# Patient Record
Sex: FEMALE | Race: WHITE
[De-identification: names, ages, dates, MRNs, and addresses within clinical notes are randomized per-mention and may not be internally consistent; named-entity substitution may affect disease eponyms.]

---

## 2017-07-12 ENCOUNTER — HOSPITAL ENCOUNTER (OUTPATIENT)
Dept: HOSPITAL 63 - MAMMO | Age: 57
Discharge: HOME | End: 2017-07-12
Payer: COMMERCIAL

## 2017-07-12 DIAGNOSIS — Z12.31: Primary | ICD-10-CM

## 2017-07-12 NOTE — RAD
DATE: 7/12/2017



EXAM: DIGITAL SCREEN BILAT W/CAD



HISTORY: Asymptomatic screening mammogram



COMPARISON: Mammogram 6/9/2016, 5/11/2015, 3/5/2014.



This study was interpreted with the benefit of Computerized Aided Detection

(CAD).





The breast parenchyma shows scattered fibroglandular densities. Breast

parenchyma level B.





FINDINGS: 



Bilateral CC and MLO views were performed.  



There are no suspicious microcalcifications, masses or areas of architectural

distortion.



Findings are stable from the prior mammogram.







IMPRESSION: Negative bilateral mammogram







BI-RADS CATEGORY: 1 NEGATIVE



RECOMMENDED FOLLOW-UP: 12M 12 MONTH FOLLOW-UP



PQRS compliance statement: Patient information was entered into a reminder

system with a target due date 7/12/2018 for the next mammogram.



Mammography is a sensitive method for finding small breast cancers, but it

does not detect them all and is not a substitute for careful clinical

examination.  A negative mammogram does not negate a clinically suspicious

finding and should not result in delay in biopsying a clinically suspicious

abnormality.



"Our facility is accredited by the [employer] Mammography

Program."

## 2017-10-06 ENCOUNTER — HOSPITAL ENCOUNTER (OUTPATIENT)
Dept: HOSPITAL 63 - US | Age: 57
Discharge: HOME | End: 2017-10-06
Payer: COMMERCIAL

## 2017-10-06 DIAGNOSIS — N88.8: Primary | ICD-10-CM

## 2017-10-06 PROCEDURE — 76856 US EXAM PELVIC COMPLETE: CPT

## 2017-10-06 PROCEDURE — 76830 TRANSVAGINAL US NON-OB: CPT

## 2017-10-06 NOTE — RAD
Pelvic ultrasound, 10/6/2017:



History: Postmenopausal bleeding



Transabdominal and transvaginal scans were obtained. The uterus measures 8 x 7

x 4.5 cm. A small Nabothian cyst is noted in the cervical region. The central

uterine echo complex measures 6-7 mm in AP dimension. There is a mildly

heterogeneous, predominantly hyperechoic smooth mass within the left side of

the uterus. It measures 5.6 x 5.3 x 4.6 cm. This most likely represents a

uterine fibroid.



The ovaries are of normal size. No adnexal mass is seen. No free fluid is

evident in the pelvis.





IMPRESSION: Moderate sized uterine mass most compatible with a fibroid.

## 2018-07-18 ENCOUNTER — HOSPITAL ENCOUNTER (OUTPATIENT)
Dept: HOSPITAL 63 - MAMMO | Age: 58
Discharge: HOME | End: 2018-07-18
Payer: COMMERCIAL

## 2018-07-18 DIAGNOSIS — Z12.31: Primary | ICD-10-CM

## 2018-07-18 PROCEDURE — 77063 BREAST TOMOSYNTHESIS BI: CPT

## 2018-07-18 PROCEDURE — 77067 SCR MAMMO BI INCL CAD: CPT

## 2018-07-18 NOTE — RAD
DATE: July 18, 2018



EXAM: MAMMO LIANG SCREENING BILATERAL



HISTORY: Routine screening.



COMPARISON: Multiple priors dating back to March 5, 2014.



TECHNIQUE: 2D digital CC and MLO views were obtained.  3D tomosynthesis

imaging was performed in the CC and MLO projections.



This study was interpreted with the benefit of Computerized Aided Detection

(CAD).



FINDINGS:



The breast parenchyma is heterogeneously dense, category C, which may obscure

small masses.  There is no worrisome mass or area of architectural distortion.

 Left retroareolar mass is stable.  Lymph node deep within the left breast

rectally back from the nipple line on MLO view is stable back to 2014.   There

are no suspicious groupings of microcalcifications.  There are a few

benign-appearing calcifications.





IMPRESSION: Stable mammogram with benign findings.





BI-RADS CATEGORY: 2 BENIGN FINDING



RECOMMENDED FOLLOW-UP: 12M 12 MONTH FOLLOW-UP



PQRS compliance statement: Patient information was entered into a reminder

system with a target due date for the next mammogram.



Mammography is a sensitive method for finding small breast cancers, but it

does not detect them all and is not a substitute for careful clinical

examination.  A negative mammogram does not negate a clinically suspicious

finding and should not result in delay in biopsying a clinically suspicious

abnormality.



"Our facility is accredited by the American [employer] Mammography

Program."

## 2020-07-13 ENCOUNTER — HOSPITAL ENCOUNTER (EMERGENCY)
Dept: HOSPITAL 63 - ER | Age: 60
Discharge: HOME | End: 2020-07-13
Payer: COMMERCIAL

## 2020-07-13 VITALS
SYSTOLIC BLOOD PRESSURE: 113 MMHG | SYSTOLIC BLOOD PRESSURE: 113 MMHG | DIASTOLIC BLOOD PRESSURE: 77 MMHG | DIASTOLIC BLOOD PRESSURE: 77 MMHG

## 2020-07-13 VITALS — BODY MASS INDEX: 33.43 KG/M2 | WEIGHT: 200.62 LBS | HEIGHT: 65 IN

## 2020-07-13 DIAGNOSIS — I10: ICD-10-CM

## 2020-07-13 DIAGNOSIS — R07.89: Primary | ICD-10-CM

## 2020-07-13 DIAGNOSIS — E78.00: ICD-10-CM

## 2020-07-13 DIAGNOSIS — E87.6: ICD-10-CM

## 2020-07-13 DIAGNOSIS — F41.8: ICD-10-CM

## 2020-07-13 LAB
ALBUMIN SERPL-MCNC: 3.9 G/DL (ref 3.4–5)
ALBUMIN/GLOB SERPL: 1.3 {RATIO} (ref 1–1.7)
ALP SERPL-CCNC: 79 U/L (ref 46–116)
ALT SERPL-CCNC: 54 U/L (ref 14–59)
ANION GAP SERPL CALC-SCNC: 13 MMOL/L (ref 6–14)
AST SERPL-CCNC: 26 U/L (ref 15–37)
BASOPHILS # BLD AUTO: 0 X10^3/UL (ref 0–0.2)
BASOPHILS NFR BLD: 1 % (ref 0–3)
BILIRUB SERPL-MCNC: 0.3 MG/DL (ref 0.2–1)
BUN/CREAT SERPL: 18 (ref 6–20)
CA-I SERPL ISE-MCNC: 21 MG/DL (ref 7–20)
CALCIUM SERPL-MCNC: 8.6 MG/DL (ref 8.5–10.1)
CHLORIDE SERPL-SCNC: 102 MMOL/L (ref 98–107)
CO2 SERPL-SCNC: 25 MMOL/L (ref 21–32)
CREAT SERPL-MCNC: 1.2 MG/DL (ref 0.6–1)
EOSINOPHIL NFR BLD: 0.1 X10^3/UL (ref 0–0.7)
EOSINOPHIL NFR BLD: 1 % (ref 0–3)
ERYTHROCYTE [DISTWIDTH] IN BLOOD BY AUTOMATED COUNT: 12.8 % (ref 11.5–14.5)
GFR SERPLBLD BASED ON 1.73 SQ M-ARVRAT: 46.1 ML/MIN
GLOBULIN SER-MCNC: 3.1 G/DL (ref 2.2–3.8)
GLUCOSE SERPL-MCNC: 143 MG/DL (ref 70–99)
HCT VFR BLD CALC: 40.4 % (ref 36–47)
HGB BLD-MCNC: 14 G/DL (ref 12–15.5)
LIPASE: 74 U/L (ref 73–393)
LYMPHOCYTES # BLD: 2.3 X10^3/UL (ref 1–4.8)
LYMPHOCYTES NFR BLD AUTO: 30 % (ref 24–48)
MCH RBC QN AUTO: 31 PG (ref 25–35)
MCHC RBC AUTO-ENTMCNC: 35 G/DL (ref 31–37)
MCV RBC AUTO: 89 FL (ref 79–100)
MONO #: 0.5 X10^3/UL (ref 0–1.1)
MONOCYTES NFR BLD: 7 % (ref 0–9)
NEUT #: 4.6 X10^3UL (ref 1.8–7.7)
NEUTROPHILS NFR BLD AUTO: 62 % (ref 31–73)
PLATELET # BLD AUTO: 235 X10^3/UL (ref 140–400)
POTASSIUM SERPL-SCNC: 2.9 MMOL/L (ref 3.5–5.1)
PROT SERPL-MCNC: 7 G/DL (ref 6.4–8.2)
RBC # BLD AUTO: 4.55 X10^6/UL (ref 3.5–5.4)
SODIUM SERPL-SCNC: 140 MMOL/L (ref 136–145)
WBC # BLD AUTO: 7.5 X10^3/UL (ref 4–11)

## 2020-07-13 PROCEDURE — 36415 COLL VENOUS BLD VENIPUNCTURE: CPT

## 2020-07-13 PROCEDURE — 93005 ELECTROCARDIOGRAM TRACING: CPT

## 2020-07-13 PROCEDURE — 84484 ASSAY OF TROPONIN QUANT: CPT

## 2020-07-13 PROCEDURE — 83690 ASSAY OF LIPASE: CPT

## 2020-07-13 PROCEDURE — 85025 COMPLETE CBC W/AUTO DIFF WBC: CPT

## 2020-07-13 PROCEDURE — 80053 COMPREHEN METABOLIC PANEL: CPT

## 2020-07-13 PROCEDURE — 85379 FIBRIN DEGRADATION QUANT: CPT

## 2020-07-13 PROCEDURE — 71045 X-RAY EXAM CHEST 1 VIEW: CPT

## 2020-07-13 PROCEDURE — 99285 EMERGENCY DEPT VISIT HI MDM: CPT

## 2020-07-13 NOTE — PHYS DOC
Past History


Past Medical History:  High Cholesterol, Hypertension


Past Surgical History:  No Surgical History


Smoking:  Non-smoker


Alcohol Use:  None





General Adult


EDM:


Chief Complaint:  CHEST PAIN





HPI:


HPI:





Patient is a 60 year old female who presents for evaluation of left-sided chest 

pain and pain rating down her left arm.  Onset of symptoms over the past 2 to 3 

days.  Patient is very anxious before arrival.  She denies any shortness of air,

dizziness, sweats or other conditions.  Patient does not have a known cardiac 

history.  Risk factors are hypertension and hyperlipidemia.  Patient is benign-

appearing otherwise on arrival





Review of Systems:


Review of Systems:


Constitutional:  Denies fever or chills 


Eyes:  Denies change in visual acuity 


HENT:  Denies nasal congestion or sore throat 


Respiratory:  Denies cough or shortness of breath 


Cardiovascular:  has chest pain no edema 


GI:  Denies abdominal pain, nausea, vomiting, bloody stools or diarrhea 


: Denies dysuria 


Musculoskeletal:  Denies back pain or joint pain 


Integument:  Denies rash 


Neurologic:  Denies headache, focal weakness or sensory changes 


Endocrine:  Denies polyuria or polydipsia 


Lymphatic:  Denies swollen glands 


Psychiatric:  Denies depression or anxiety





Heart Score:


HEART Score for Chest Pain:  








HEART Score for Chest Pain Response (Comments) Value


 


History Slighlty/Non-Suspicious 0


 


ECG Normal 0


 


Age >45 - < 65 1


 


Risk Factors                            1 or 2 Risk Factors 1


 


Troponin < Normal Limit 0


 


Total  2








Risk Factors:


Risk Factors:  DM, Current or recent (<one month) smoker, HTN, HLP, family 

history of CAD, obesity.


Risk Scores:


Score 0 - 3:  2.5% MACE over next 6 weeks - Discharge Home


Score 4 - 6:  20.3% MACE over next 6 weeks - Admit for Clinical Observation


Score 7 - 10:  72.7% MACE over next 6 weeks - Early Invasive Strategies





Physical Exam:


PE:





Constitutional: Well developed, well nourished, mild acute distress, non-toxic 

appearance. []


HENT: Normocephalic, atraumatic, bilateral external ears normal, oropharynx 

moist, no oral exudates, nose normal. []


Eyes: PERRL, EOMI, conjunctiva normal, no discharge. [] 


Neck: Normal range of motion, no tenderness, supple. [] 


Cardiovascular:Heart rate regular rhythm, no murmur []


Lungs & Thorax:  Bilateral breath sounds clear to auscultation []


Abdomen: Bowel sounds normal, soft, no tenderness, no masses, no pulsatile 

masses. [] 


Skin: Warm, dry, no erythema, no rash. [] 


Back: No tenderness. [] 


Extremities: No tenderness, no cyanosis, ROM intact, no edema. [] 


Neurologic: Alert and oriented X 3, normal motor function, normal sensory f

unction, no focal deficits noted. []


Psychologic: Affect normal, judgement normal, mood normal. []





Current Patient Data:


Labs:





Laboratory Tests








Test


 20


14:46


 


White Blood Count 7.5 x10^3/uL 


 


Red Blood Count 4.55 x10^6/uL 


 


Hemoglobin 14.0 g/dL 


 


Hematocrit 40.4 % 


 


Mean Corpuscular Volume 89 fL 


 


Mean Corpuscular Hemoglobin 31 pg 


 


Mean Corpuscular Hemoglobin


Concent 35 g/dL 





 


Red Cell Distribution Width 12.8 % 


 


Platelet Count 235 x10^3/uL 


 


Neutrophils (%) (Auto) 62 % 


 


Lymphocytes (%) (Auto) 30 % 


 


Monocytes (%) (Auto) 7 % 


 


Eosinophils (%) (Auto) 1 % 


 


Basophils (%) (Auto) 1 % 


 


Neutrophils # (Auto) 4.6 x10^3uL 


 


Lymphocytes # (Auto) 2.3 x10^3/uL 


 


Monocytes # (Auto) 0.5 x10^3/uL 


 


Eosinophils # (Auto) 0.1 x10^3/uL 


 


Basophils # (Auto) 0.0 x10^3/uL 


 


D-Dimer (Emerald) < 0.19 mg/L 


 


Sodium Level 140 mmol/L 


 


Potassium Level 2.9 mmol/L 


 


Chloride Level 102 mmol/L 


 


Carbon Dioxide Level 25 mmol/L 


 


Anion Gap 13 


 


Blood Urea Nitrogen 21 mg/dL 


 


Creatinine 1.2 mg/dL 


 


Estimated GFR


(Cockcroft-Gault) 46.1 





 


BUN/Creatinine Ratio 18 


 


Glucose Level 143 mg/dL 


 


Calcium Level 8.6 mg/dL 


 


Total Bilirubin 0.3 mg/dL 


 


Aspartate Amino Transf


(AST/SGOT) 26 U/L 





 


Alanine Aminotransferase


(ALT/SGPT) 54 U/L 





 


Alkaline Phosphatase 79 U/L 


 


Troponin I Quantitative < 0.017 ng/mL 


 


Total Protein 7.0 g/dL 


 


Albumin 3.9 g/dL 


 


Albumin/Globulin Ratio 1.3 


 


Lipase 74 U/L 








Current Medications








 Medications


  (Trade)  Dose


 Ordered  Sig/Kena


 Route


 PRN Reason  Start Time


 Stop Time Status Last Admin


Dose Admin


 


 Aspirin


  (Aspirin


 Chewable)  324 mg  1X  ONCE


 PO


   20 15:00


 20 15:01 DC 20 14:59





 


 Potassium Chloride


  (Klor-Con)  40 meq  1X  ONCE


 PO


   20 15:45


 20 15:46 DC 20 15:47














EKG:


EKG:


Normal sinus rhythm, rate 90, leftward axis, nonspecific ST segment changes, not

 STEMI read at 1447 []





Radiology/Procedures:


Radiology/Procedures:


[facility] [address], [place] [address]


                                 [phone]


                                        


                                 IMAGING REPORT





                                     Signed





PATIENT: AG GARZA   ACCOUNT: [id number]     MRN#: [id number]


: 1960           LOCATION: ER              AGE: 60


SEX: F                    EXAM DT: 20         ACCESSION#: [id number]


STATUS: REG ER            ORD. PHYSICIAN: KYLER MUÑOZ DO


REASON: chest pain


PROCEDURE: PORTABLE CHEST 1V





AP chest.


 


HISTORY: Chest pain


 


AP view was taken of the chest. Lungs are free of infiltrates. Heart is 


normal in size. There is no effusion.


 


IMPRESSION:


1. No acute chest disease.


 


Electronically signed by: Renny Lynn MD (2020 3:24 PM) [id number]














DICTATED AND SIGNED BY:     RENNY LYNN MD


DATE:     20 1524





CC: RICARDO VERDUGO; KYLER MUÑOZ DO ~


[]





Course & Med Decision Making:


Course & Med Decision Making


Pertinent Labs and Imaging studies reviewed. (See chart for details)





[]





Dragon Disclaimer:


Dragon Disclaimer:


This electronic medical record was generated, in whole or in part, using a voice

 recognition dictation system.





1640 delta troponin normal.  Patient is not currently having chest pain.  Close 

follow-up recommended.  Etiology of her pain is unclear but no dangerous 

etiology found.





Departure


Departure:


Impression:  


   Primary Impression:  


   Acute chest pain


   Additional Impression:  


   Hypokalemia


Disposition:   HOME/RESIDENCE PRIOR TO ADM


Condition:  STABLE


Referrals:  


RICARDO VERDUGO (PCP)


Patient Instructions:  Chest Pain (Nonspecific), Hypokalemia





Additional Instructions:  


Take medication as directed, call and see your doctor right away and follow up, 

should your symptoms worsen or persist return to the ER


Scripts


Potassium Chloride (POTASSIUM CHLORIDE **) 20 Meq Tablet.er


20 MEQ PO DAILY for SUPPLEMENT, #10 TAB


   Prov: KYLER MUÑOZ DO         20





Justification of Admission:


Justification of Admission:


Justification of Admission Dx:  N/A











KYLER MUÑOZ DO              2020 14:48

## 2020-07-13 NOTE — EKG
[facility] [address], [place] [address]

Test Date:    2020               Test Time:    14:44:06

Pat Name:     AG GARZA             Department:   

Patient ID:   [id number]           Room:          

Gender:       F                        Technician:   

:          1960               Requested By: KYLER MUÑOZ

Order Number: [id number]            Reading MD:     

                                 Measurements

Intervals                              Axis          

Rate:         90                       P:            29

GA:           158                      QRS:          0

QRSD:         86                       T:            52

QT:           346                                    

QTc:          427                                    

                           Interpretive Statements

SINUS RHYTHM

LEFTWARD AXIS

NO SPECIFIC ECG ABNORMALITIES

RI6.02

No previous ECG available for comparison

## 2020-07-13 NOTE — RAD
AP chest.

 

HISTORY: Chest pain

 

AP view was taken of the chest. Lungs are free of infiltrates. Heart is 

normal in size. There is no effusion.

 

IMPRESSION:

1. No acute chest disease.

 

Electronically signed by: Renny Lynn MD (7/13/2020 3:24 PM) [id number]

## 2021-01-05 ENCOUNTER — HOSPITAL ENCOUNTER (OUTPATIENT)
Dept: HOSPITAL 63 - MAMMO | Age: 61
End: 2021-01-05
Payer: COMMERCIAL

## 2021-01-05 DIAGNOSIS — Z12.31: Primary | ICD-10-CM

## 2021-01-05 DIAGNOSIS — N95.8: ICD-10-CM

## 2021-01-05 PROCEDURE — 77067 SCR MAMMO BI INCL CAD: CPT

## 2021-01-05 PROCEDURE — 77063 BREAST TOMOSYNTHESIS BI: CPT

## 2021-01-05 PROCEDURE — 77080 DXA BONE DENSITY AXIAL: CPT

## 2021-01-05 NOTE — RAD
EXAM: DUAL ENERGY X-RAY ABSORPTIOMETRY (DEXA).



HISTORY: Postmenopausal screening.



FINDINGS: The lowest measured T-score is 0.6 in the right hip, based on a bone mineral density of 1.0
35 g/cm^2. Refer to the worksheets for full detail.



No comparison examinations are available.



IMPRESSION:

1. Normal. Bone mineral density yields a T-score of -1.0 or greater. Fracture risk is low.

2. FRAX report: Not calculated.





METHODOLOGY: Dual energy x-ray absorptiometry was performed to measure bone mineral density. The foll
owing analysis is based on the 2019 Official Positions of the International Society for Clinical Dens
itometry: 



Measurements of the hips and the average of L1-L4 are preferred. When the spine and/or hip cannot be 
feasibly measured or interpreted, or in the setting of hyperparathyroidism, distal radial bone minera
l density may be measured. 



The lumbar spine T-score is based on the average bone mineral density of L1-L4. In the setting of art
ifact or anatomic abnormality, some lumbar levels may be excluded, and the remaining levels used for 
calculation. A single lumbar level is not used for diagnosis, and if only a single level is available
 for assessment, another anatomic site will be used to assign a diagnosis.



The hip T-score is based on the bone mineral density measurement of the femoral neck or total proxima
l femur of either side, whichever is lowest. Bilateral mean values are not used for diagnosis.



The forearm T-score is derived from 33% of the distal radius of the nondominant forearm.



Electronically signed by: Ruth Correa MD (1/5/2021 11:43 AM) [id number]

## 2021-01-05 NOTE — RAD
EXAM: Bilateral digital screening mammogram with tomosynthesis.



HISTORY: 60-year-old female presents for screening mammography.



TECHNIQUE: Full-field digital craniocaudal and mediolateral oblique 2D and 3D tomosynthesis images of
 both breasts are obtained for evaluation. Computer aided detection was applied.



COMPARISON: 7/18/2018



BREAST PARENCHYMAL DENSITY: Level C - Heterogeneously dense.



FINDINGS: There is no new suspicious mass, microcalcification or region of architectural distortion. 
There is stable areas of asymmetry within both breasts. The greater than two-year course of stability
 favors benignity.



IMPRESSION: BI-RADS Category 2: Benign finding(s). 



RECOMMENDATION: Annual mammography is recommended.



If your mammogram demonstrates that you have dense breast tissue, which could hide abnormalities, and
 if you have other risk factors for breast cancer that have been identified, you might benefit from s
upplemental screening tests that may be suggested by your ordering physician.  Dense breast tissue, i
n and of itself, is a relatively common condition.  This information is not provided to cause undue c
oncern, but rather to raise your awareness and to promote discussion with your physician regarding th
e presence of other risk factors, in addition to dense breast tissue. A report of your mammography re
sults will be sent to you and your physician.  You should contact your physician if you have any ques
tions or concerns regarding this report.  



Mammography is a sensitive method for finding small breast cancers, but it does not detect them all a
nd is not a substitute for careful clinical examination.  A negative mammogram does not negate a clin
ically suspicious finding and should not result in delay in biopsying a clinically suspicious abnorma
lity.



PQRS compliance statement -  Patient information was entered into a reminder system with a target due
 date for the next mammogram. 



"Our facility is accredited by the American [employer] Mammography Program."



Electronically signed by: Ruth Correa MD (1/5/2021 11:01 AM) [id number]